# Patient Record
(demographics unavailable — no encounter records)

---

## 2024-10-09 NOTE — PLAN
[FreeTextEntry1] : Immunosuppression - Thymo induction; Cont Envarsus 4mg daily, Prednisone 5mg daily, Cellcept 1gm bid Will check tacrolimus level and adjust dose for goal ~8 Prophylaxis with nystatin, valcyte and bactrim Recent urosepsis episode- treated with IV ABx and stent was removed. He has had problems with ureteral calculi in the past and had a UTI 6 months prior to transplant. CT showed significant stone burden at the left UVJ in the native horseshoe kidney and is likely contributing to the of UTIs and plan is to schedule a nephrectomy (ideally after 6 months post txp).   Patient to follow-up with nephrology next week

## 2024-10-09 NOTE — HISTORY OF PRESENT ILLNESS
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 worsened over last year requiring dialysis started 2/2024 via permcath and now is s/p LDRT from [facility] donor on 9/9/24. Readmitted with sepsis from transplant pyelonephritis, treated with zosyn and improved. Transplant ureteral stent was removed in hospital.  He has had problems with ureteral calculi in the past and had a UTI 6 months prior to transplant and is likely the source of his current UTI. CT showed significant stone burden at the left UVJ in the native horseshoe kidney and is likely source of UTI and plan is to schedule a nephrectomy (ideally after 6 months post txp).  Currently Denies any fevers, abdominal pain, nausea, diarrhea, hematuria or dysuria.   Last Cr 1.56, Currently taking Envarsus 4mg daily (was supposed to be 5mg but he never increased it)

## 2024-10-10 NOTE — HISTORY OF PRESENT ILLNESS
[FreeTextEntry1] : now s/p renal transplant two recent hospitalizations for ? UTI, stones seen in distal left ureter all urine cultures have been negative since at least march 2024  reviewed all available CT images and compared to intraoperative fluoroscopy images from ureteroscopy before transplant. No ureteral stones seen at that time intraoperatively  review of most recent images do not show any stranding surrounding the supposed ureteral segment with stones

## 2024-10-10 NOTE — ASSESSMENT
[FreeTextEntry1] : Will have patient undergo ureteroscopy to reassess. Suspect that there might be duplication of ureter LEFT ureteroscopy laser lithotripsy stent insertion. Risks, benefits and alternatives discussed. Risk of infection, multiple procedures, ureteral injury, ureteral stricture, incomplete stones clearance, sepsis, bleeding, retention, all discussed. Also discussed the possible need for a temporary ureteral stent. The possible side effects of a temporary ureteral stent, including flank pain, hematuria, and bladder spasms, bladder pressure, small volume voids, dysuria. The patient understands that a stent is a temporary implant that must be removed in the future.

## 2024-10-17 NOTE — ASSESSMENT
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  1. s/p LUDT 9/9/24- Allograft function with good UOP and Cr trending down.  2. IS meds- Thymo induction. Envarsus with goal 8-10, MMF 1 gm po bid and prednisone 5 mg po daily.  ppx with bactrim, valcyte and nystatin.  3. Recent urosepsis episode- treated with IV Abx and stent was removed.  He has had problems with ureteral calculi in the past and had a UTI 6 months prior to transplant. CT showed significant stone burden at the left UVJ in the native horseshoe kidney and is likely contributing to the of UTIs and plan is to schedule a nephrectomy (ideally after 6 months post txp) .

## 2024-10-17 NOTE — HISTORY OF PRESENT ILLNESS
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 worsened over last year requiring dialysis started 2/2024  via permacath and now is s/p LDRT on 9/9/24.  He was recently discharged after being admitted for sepsis due to transplant pyelonephritis was treated with IV zosyn.- Transplant ureteral stent was removed on 9/29  He has had problems with ureteral calculi in the past and had a UTI 6 months prior to transplant and is likely the source of his current UTI. CT showed significant stone burden at the left UVJ in the native horseshoe kidney and is likely source of UTI and plan is to schedule a nephrectomy (ideally after 6 months post txp)   He feels well. Denies any fever, chills, dysuria, LE edema, cough, sob, chest pain, nausea, vomiting, diarrhea, constipation or any other active complaints.

## 2024-10-31 NOTE — ASSESSMENT
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  1. s/p LUDT 9/9/24- Allograft function is good, most recent Cr was 1.16 2. IS meds- Thymo induction. Envarsus with goal 8-10, MMF 1 gm po bid and prednisone 5 mg po daily.  ppx with bactrim, valcyte and nystatin.  3. h/o urosepsis episode- treated with IV Abx and stent was removed.  He has had problems with ureteral calculi in the past and had a UTI 6 months prior to transplant. CT showed significant stone burden at the left UVJ in the native horseshoe kidney and is likely contributing to the of UTIs and plan is to schedule a nephrectomy (ideally after 6 months post txp) .

## 2024-11-13 NOTE — REVIEW OF SYSTEMS
Isotretinoin Counseling: Patient should get monthly blood tests, not donate blood, not drive at night if vision affected, not share medication, and not undergo elective surgery for 6 months after tx completed. Side effects reviewed, pt to contact office should one occur. Benzoyl Peroxide Pregnancy And Lactation Text: This medication is Pregnancy Category C. It is unknown if benzoyl peroxide is excreted in breast milk. Topical Retinoid Pregnancy And Lactation Text: This medication is Pregnancy Category C. It is unknown if this medication is excreted in breast milk. Erythromycin Pregnancy And Lactation Text: This medication is Pregnancy Category B and is considered safe during pregnancy. It is also excreted in breast milk. Use Enhanced Medication Counseling?: No Bactrim Pregnancy And Lactation Text: This medication is Pregnancy Category D and is known to cause fetal risk.  It is also excreted in breast milk. Bactrim Counseling:  I discussed with the patient the risks of sulfa antibiotics including but not limited to GI upset, allergic reaction, drug rash, diarrhea, dizziness, photosensitivity, and yeast infections.  Rarely, more serious reactions can occur including but not limited to aplastic anemia, agranulocytosis, methemoglobinemia, blood dyscrasias, liver or kidney failure, lung infiltrates or desquamative/blistering drug rashes. Detail Level: Zone High Dose Vitamin A Pregnancy And Lactation Text: High dose vitamin A therapy is contraindicated during pregnancy and breast feeding. Tazorac Pregnancy And Lactation Text: This medication is not safe during pregnancy. It is unknown if this medication is excreted in breast milk. Isotretinoin Pregnancy And Lactation Text: This medication is Pregnancy Category X and is considered extremely dangerous during pregnancy. It is unknown if it is excreted in breast milk. Dapsone Pregnancy And Lactation Text: This medication is Pregnancy Category C and is not considered safe during pregnancy or breast feeding. Tazorac Counseling:  Patient advised that medication is irritating and drying.  Patient may need to apply sparingly and wash off after an hour before eventually leaving it on overnight.  The patient verbalized understanding of the proper use and possible adverse effects of tazorac.  All of the patient's questions and concerns were addressed. Birth Control Pills Pregnancy And Lactation Text: This medication should be avoided if pregnant and for the first 30 days post-partum. Benzoyl Peroxide Counseling: Patient counseled that medicine may cause skin irritation and bleach clothing.  In the event of skin irritation, the patient was advised to reduce the amount of the drug applied or use it less frequently.   The patient verbalized understanding of the proper use and possible adverse effects of benzoyl peroxide.  All of the patient's questions and concerns were addressed. Minocycline Pregnancy And Lactation Text: This medication is Pregnancy Category D and not consider safe during pregnancy. It is also excreted in breast milk. Detail Level: Detailed Birth Control Pills Counseling: Birth Control Pill Counseling: I discussed with the patient the potential side effects of OCPs including but not limited to increased risk of stroke, heart attack, thrombophlebitis, deep venous thrombosis, hepatic adenomas, breast changes, GI upset, headaches, and depression.  The patient verbalized understanding of the proper use and possible adverse effects of OCPs. All of the patient's questions and concerns were addressed. Spironolactone Counseling: Patient advised regarding risks of diarrhea, abdominal pain, hyperkalemia, birth defects (for female patients), liver toxicity and renal toxicity. The patient may need blood work to monitor liver and kidney function and potassium levels while on therapy. The patient verbalized understanding of the proper use and possible adverse effects of spironolactone.  All of the patient's questions and concerns were addressed. Topical Sulfur Applications Counseling: Topical Sulfur Counseling: Patient counseled that this medication may cause skin irritation or allergic reactions.  In the event of skin irritation, the patient was advised to reduce the amount of the drug applied or use it less frequently.   The patient verbalized understanding of the proper use and possible adverse effects of topical sulfur application.  All of the patient's questions and concerns were addressed. Tetracycline Counseling: Patient counseled regarding possible photosensitivity and increased risk for sunburn.  Patient instructed to avoid sunlight, if possible.  When exposed to sunlight, patients should wear protective clothing, sunglasses, and sunscreen.  The patient was instructed to call the office immediately if the following severe adverse effects occur:  hearing changes, easy bruising/bleeding, severe headache, or vision changes.  The patient verbalized understanding of the proper use and possible adverse effects of tetracycline.  All of the patient's questions and concerns were addressed. Patient understands to avoid pregnancy while on therapy due to potential birth defects. Dapsone Counseling: I discussed with the patient the risks of dapsone including but not limited to hemolytic anemia, agranulocytosis, rashes, methemoglobinemia, kidney failure, peripheral neuropathy, headaches, GI upset, and liver toxicity.  Patients who start dapsone require monitoring including baseline LFTs and weekly CBCs for the first month, then every month thereafter.  The patient verbalized understanding of the proper use and possible adverse effects of dapsone.  All of the patient's questions and concerns were addressed. [Negative] : Heme/Lymph Minocycline Counseling: Patient advised regarding possible photosensitivity and discoloration of the teeth, skin, lips, tongue and gums.  Patient instructed to avoid sunlight, if possible.  When exposed to sunlight, patients should wear protective clothing, sunglasses, and sunscreen.  The patient was instructed to call the office immediately if the following severe adverse effects occur:  hearing changes, easy bruising/bleeding, severe headache, or vision changes.  The patient verbalized understanding of the proper use and possible adverse effects of minocycline.  All of the patient's questions and concerns were addressed. Spironolactone Pregnancy And Lactation Text: This medication can cause feminization of the male fetus and should be avoided during pregnancy. The active metabolite is also found in breast milk. Topical Clindamycin Pregnancy And Lactation Text: This medication is Pregnancy Category B and is considered safe during pregnancy. It is unknown if it is excreted in breast milk. High Dose Vitamin A Counseling: Side effects reviewed, pt to contact office should one occur. Doxycycline Pregnancy And Lactation Text: This medication is Pregnancy Category D and not consider safe during pregnancy. It is also excreted in breast milk but is considered safe for shorter treatment courses. Azithromycin Pregnancy And Lactation Text: This medication is considered safe during pregnancy and is also secreted in breast milk. Topical Retinoid counseling:  Patient advised to apply a pea-sized amount only at bedtime and wait 30 minutes after washing their face before applying.  If too drying, patient may add a non-comedogenic moisturizer. The patient verbalized understanding of the proper use and possible adverse effects of retinoids.  All of the patient's questions and concerns were addressed. Doxycycline Counseling:  Patient counseled regarding possible photosensitivity and increased risk for sunburn.  Patient instructed to avoid sunlight, if possible.  When exposed to sunlight, patients should wear protective clothing, sunglasses, and sunscreen.  The patient was instructed to call the office immediately if the following severe adverse effects occur:  hearing changes, easy bruising/bleeding, severe headache, or vision changes.  The patient verbalized understanding of the proper use and possible adverse effects of doxycycline.  All of the patient's questions and concerns were addressed. Topical Clindamycin Counseling: Patient counseled that this medication may cause skin irritation or allergic reactions.  In the event of skin irritation, the patient was advised to reduce the amount of the drug applied or use it less frequently.   The patient verbalized understanding of the proper use and possible adverse effects of clindamycin.  All of the patient's questions and concerns were addressed. Azithromycin Counseling:  I discussed with the patient the risks of azithromycin including but not limited to GI upset, allergic reaction, drug rash, diarrhea, and yeast infections. Topical Sulfur Applications Pregnancy And Lactation Text: This medication is Pregnancy Category C and has an unknown safety profile during pregnancy. It is unknown if this topical medication is excreted in breast milk. Erythromycin Counseling:  I discussed with the patient the risks of erythromycin including but not limited to GI upset, allergic reaction, drug rash, diarrhea, increase in liver enzymes, and yeast infections.

## 2024-11-13 NOTE — ASSESSMENT
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  1. s/p LUDT 9/9/24- Allograft function is good, most recent Cr was 1.16 2. IS meds- Thymo induction. Envarsus 8 mg po bid with goal 8-10,  mg po bid and prednisone 5 mg po daily. ppx with bactrim, valcyte and nystatin.  3. h/o urosepsis episode- treated with IV Abx and stent was removed.  He has had problems with ureteral calculi in the past and had a UTI 6 months prior to transplant. CT showed significant stone burden at the left UVJ in the native horseshoe kidney and is likely contributing to the of UTIs and plan is to schedule a nephrectomy (ideally after 6 months post txp) .

## 2024-11-27 NOTE — ASSESSMENT
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  1. s/p LUDT 9/9/24- Allograft function is good, most recent Cr was 1.3 2. IS meds- Thymo induction. Envarsus 9 mg po bid with goal 8-10,  mg po bid and prednisone 5 mg po daily. ppx with bactrim, valcyte and nystatin.  3. h/o urosepsis episode- treated with IV Abx and stent was removed.  He has had problems with ureteral calculi in the past and had a UTI 6 months prior to transplant. CT showed significant stone burden at the left UVJ in the native horseshoe kidney and is likely contributing to the of UTIs and plan is to schedule a nephrectomy (ideally after 6 months post txp) .

## 2024-12-11 NOTE — PHYSICAL EXAM
[General Appearance - Alert] : alert [General Appearance - In No Acute Distress] : in no acute distress [Sclera] : the sclera and conjunctiva were normal [PERRL With Normal Accommodation] : pupils were equal in size, round, and reactive to light [Extraocular Movements] : extraocular movements were intact [Outer Ear] : the ears and nose were normal in appearance [Oropharynx] : the oropharynx was normal [Neck Appearance] : the appearance of the neck was normal [Neck Cervical Mass (___cm)] : no neck mass was observed [Jugular Venous Distention Increased] : there was no jugular-venous distention [Thyroid Diffuse Enlargement] : the thyroid was not enlarged [Thyroid Nodule] : there were no palpable thyroid nodules [Auscultation Breath Sounds / Voice Sounds] : lungs were clear to auscultation bilaterally [Heart Rate And Rhythm] : heart rate was normal and rhythm regular [Heart Sounds] : normal S1 and S2 [Heart Sounds Gallop] : no gallops [Murmurs] : no murmurs [Heart Sounds Pericardial Friction Rub] : no pericardial rub [Full Pulse] : the pedal pulses are present [Edema] : there was no peripheral edema [Bowel Sounds] : normal bowel sounds [Abdomen Soft] : soft [Abdomen Tenderness] : non-tender [Abdomen Mass (___ Cm)] : no abdominal mass palpated [Abnormal Walk] : normal gait [Musculoskeletal - Swelling] : no joint swelling seen [Nail Clubbing] : no clubbing  or cyanosis of the fingernails [Motor Tone] : muscle strength and tone were normal [Skin Color & Pigmentation] : normal skin color and pigmentation [Skin Turgor] : normal skin turgor [Normal] : normal [] : no rash [Deep Tendon Reflexes (DTR)] : deep tendon reflexes were 2+ and symmetric [Sensation] : the sensory exam was normal to light touch and pinprick [No Focal Deficits] : no focal deficits

## 2024-12-11 NOTE — ASSESSMENT
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  1. s/p LUDT 9/9/24- Allograft function is good, most recent Cr was 1.3 2. IS meds- Thymo induction. Envarsus 10 mg po bid with goal 8-10,  mg po bid and prednisone 5 mg po daily. ppx with bactrim, valcyte and nystatin.  3. h/o urosepsis episode- treated with IV Abx and stent was removed.  He has had problems with ureteral calculi in the past and had a UTI 6 months prior to transplant. CT showed significant stone burden at the left UVJ in the native horseshoe kidney and is likely contributing to the of UTIs and plan is to schedule a nephrectomy (ideally after 6 months post txp) .

## 2025-01-08 NOTE — ASSESSMENT
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  1. s/p LUDT 9/9/24- Allograft function is good, most recent Cr was 1.3 2. IS meds- Thymo induction. Envarsus 10 mg po bid with goal 8-10,  mg po bid and prednisone 5 mg po daily. ppx with bactrim and valcyte  3. h/o urosepsis episode- treated with IV Abx and stent was removed.  He has had problems with ureteral calculi in the past and had a UTI 6 months prior to transplant. CT showed significant stone burden at the left UVJ in the native horseshoe kidney and is likely contributing to the of UTIs and plan is to schedule a nephrectomy (ideally after 6 months post txp) .

## 2025-01-08 NOTE — PHYSICAL EXAM
[General Appearance - Alert] : alert [General Appearance - In No Acute Distress] : in no acute distress [Sclera] : the sclera and conjunctiva were normal [PERRL With Normal Accommodation] : pupils were equal in size, round, and reactive to light [Extraocular Movements] : extraocular movements were intact [Outer Ear] : the ears and nose were normal in appearance [Oropharynx] : the oropharynx was normal [Neck Cervical Mass (___cm)] : no neck mass was observed [Neck Appearance] : the appearance of the neck was normal [Jugular Venous Distention Increased] : there was no jugular-venous distention [Thyroid Diffuse Enlargement] : the thyroid was not enlarged [Thyroid Nodule] : there were no palpable thyroid nodules [Auscultation Breath Sounds / Voice Sounds] : lungs were clear to auscultation bilaterally [Heart Rate And Rhythm] : heart rate was normal and rhythm regular [Heart Sounds] : normal S1 and S2 [Heart Sounds Gallop] : no gallops [Murmurs] : no murmurs [Heart Sounds Pericardial Friction Rub] : no pericardial rub [Full Pulse] : the pedal pulses are present [Edema] : there was no peripheral edema [Bowel Sounds] : normal bowel sounds [Abdomen Soft] : soft [Abdomen Tenderness] : non-tender [Abdomen Mass (___ Cm)] : no abdominal mass palpated [Abnormal Walk] : normal gait [Nail Clubbing] : no clubbing  or cyanosis of the fingernails [Musculoskeletal - Swelling] : no joint swelling seen [Skin Color & Pigmentation] : normal skin color and pigmentation [Motor Tone] : muscle strength and tone were normal [Skin Turgor] : normal skin turgor [] : no rash [Normal] : normal [Deep Tendon Reflexes (DTR)] : deep tendon reflexes were 2+ and symmetric [Sensation] : the sensory exam was normal to light touch and pinprick [No Focal Deficits] : no focal deficits

## 2025-02-12 NOTE — ASSESSMENT
[FreeTextEntry1] : Will have patient undergo ureteroscopy to reassess. Suspect that there might be duplication of ureter or calcifications are extra-ureteral LEFT ureteroscopy laser lithotripsy stent insertion. Risks, benefits and alternatives discussed. Risk of infection, multiple procedures, ureteral injury, ureteral stricture, incomplete stones clearance, sepsis, bleeding, retention, all discussed. Also discussed the possible need for a temporary ureteral stent. The possible side effects of a temporary ureteral stent, including flank pain, hematuria, and bladder spasms, bladder pressure, small volume voids, dysuria. The patient understands that a stent is a temporary implant that must be removed in the future.  CT stone hunt before surgery Pt's mother present by phone for entire encounter today

## 2025-02-12 NOTE — HISTORY OF PRESENT ILLNESS
[FreeTextEntry1] : 22 yr old man kidney stones  Hx- horseshoe kidney, s/p partial nephrectomy 2013, developed HTN, Gout, hyperlipidemia and CKD. s/p renal transplant 9/9/24.  two recent hospitalizations for ? UTI, stones seen in distal left ureter all urine cultures have been negative since at least march 2024  reviewed all available CT images and compared to intraoperative fluoroscopy images from ureteroscopy before transplant. 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

## 2025-03-17 NOTE — HISTORY OF PRESENT ILLNESS
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 worsened over last year requiring dialysis started 2/2024  via permacath and now is s/p LDRT on 9/9/24. Post txp he was admitted for sepsis due to transplant pyelonephritis and was treated with IV zosyn. Transplant ureteral stent was removed on 9/29/25  He has had problems with ureteral calculi in the past and had a UTI 6 months prior to transplant . CT showed significant stone burden at the left UVJ in the native horseshoe kidney and is likely source of UTI .Recently seen by urology Dr Riddle.  He feels well. Denies any fever, chills, dysuria, LE edema, cough, sob, chest pain, nausea, vomiting, diarrhea, constipation or any other active complaints.

## 2025-03-17 NOTE — ASSESSMENT
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  1. s/p LUDT 9/9/24- Allograft function is good, most recent Cr was 1.3 2. IS meds- Thymo induction. Envarsus 10 mg po bid with goal 8-10,  mg po bid and prednisone 5 mg po daily. ppx with bactrim and valcyte  3. h/o urosepsis episode- treated with IV Abx and stent was removed.  4. h/o ureteral calculi in the past and had a UTI 6 months prior to transplant. CT showed significant stone burden at the left UVJ in the native horseshoe kidney. Following for Dr Riddle ( urology)

## 2025-03-17 NOTE — HISTORY OF PRESENT ILLNESS
[FreeTextEntry1] : 22 yr old man kidney stones  s/p left URS with Dr. Riddle on 3/11/25 pathology- pending  Hx- horseshoe kidney, s/p partial nephrectomy 2013, developed HTN, Gout, hyperlipidemia and CKD. s/p renal transplant 9/9/24.

## 2025-04-10 NOTE — ASSESSMENT
[FreeTextEntry1] : No stones seen during left ureteroscopy with Dr. Riddle  Pt states that he has had kidney stones in the past which required surgery   Increase fluid intake to 3 L per day   plan 24 hr x 2 urine before next visit  Renal sono at or before next visit  Follow up in 3 months

## 2025-04-10 NOTE — HISTORY OF PRESENT ILLNESS
[FreeTextEntry1] : 22 yr old male presents to follow up with kidney stones.  Hx- horseshoe kidney, s/p partial nephrectomy 2013, developed HTN, Gout, hyperlipidemia and CKD. s/p renal transplant 9/9/24.  s/p cystoscopy, left retrograde pyelogram of native kidney and left ureteroscopy of native kidney on 3/11/25 - no stones seen   [Dysuria] : no dysuria [Hematuria - Gross] : no gross hematuria [None] : None

## 2025-05-14 NOTE — HISTORY OF PRESENT ILLNESS
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 worsened over last year requiring dialysis started 2/2024  via permacath and now is s/p LDRT on 9/9/24. Post txp he was admitted for sepsis due to transplant pyelonephritis and was treated with IV zosyn. Transplant ureteral stent was removed on 9/29/25  He has had problems with ureteral calculi in the past and had a UTI 6 months prior to transplant . CT showed significant stone burden at the left UVJ in the native horseshoe kidney and is likely source of UTI . seen by urology Dr Riddle .s/p cystoscopy, left retrograde pyelogram of native kidney and left ureteroscopy of native kidney on 3/11/25- no stones seen  He feels well. Denies any fever, chills, dysuria, LE edema, cough, sob, chest pain, nausea, vomiting, diarrhea, constipation or any other active complaints.

## 2025-05-14 NOTE — ASSESSMENT
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  1. s/p LUDT 9/9/24- Allograft function is good, most recent Cr was 1.5 2. IS meds- Thymo induction. Envarsus 10 mg po bid with goal 8-10,  mg po bid and prednisone 5 mg po daily. ppx with bactrim and valcyte  3. h/o urosepsis episode- treated with IV Abx and stent was removed.  4. h/o ureteral calculi in the past and had a UTI 6 months prior to transplant. CT showed significant stone burden at the left UVJ in the native horseshoe kidney. Following for Dr Riddle ( urology)  .s/p cystoscopy, left retrograde pyelogram of native kidney and left ureteroscopy of native kidney on 3/11/25- no stones seen

## 2025-06-12 NOTE — ASSESSMENT
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  1. s/p LUDT 9/9/24- Allograft function is good, most recent Cr was 1.5 2. IS meds- Thymo induction. on Envarsus with goal 6-8 ,  mg po bid and prednisone 5 mg po daily.  3. h/o urosepsis episode post txp- treated with IV Abx and stent was removed.  4. h/o ureteral calculi in the past and had a UTI 6 months prior to transplant. CT showed significant stone burden at the left UVJ in the native horseshoe kidney. Following for Dr Riddle ( urology)  .s/p cystoscopy, left retrograde pyelogram of native kidney and left ureteroscopy of native kidney on 3/11/25- no stones seen. has f/u appt in 3 months

## 2025-06-12 NOTE — HISTORY OF PRESENT ILLNESS
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 worsened over last year requiring dialysis started 2/2024  via permacath and now is s/p LDRT on 9/9/24. Post txp he was admitted for sepsis due to transplant pyelonephritis and was treated with IV zosyn. Transplant ureteral stent was removed on 9/29/25  He has had problems with ureteral calculi in the past and had a UTI 6 months prior to transplant . CT showed significant stone burden at the left UVJ in the native horseshoe kidney and is likely source of UTI . seen by urology Dr Riddle .s/p cystoscopy, left retrograde pyelogram of native kidney and left ureteroscopy of native kidney on 3/11/25- no stones seen.   He feels well. Denies any fever, chills, dysuria, LE edema, cough, sob, chest pain, nausea, vomiting, diarrhea, constipation or any other active complaints.

## 2025-06-12 NOTE — PHYSICAL EXAM
[General Appearance - Alert] : alert [Sclera] : the sclera and conjunctiva were normal [General Appearance - In No Acute Distress] : in no acute distress [PERRL With Normal Accommodation] : pupils were equal in size, round, and reactive to light [Extraocular Movements] : extraocular movements were intact [Oropharynx] : the oropharynx was normal [Outer Ear] : the ears and nose were normal in appearance [Neck Appearance] : the appearance of the neck was normal [Neck Cervical Mass (___cm)] : no neck mass was observed [Thyroid Diffuse Enlargement] : the thyroid was not enlarged [Jugular Venous Distention Increased] : there was no jugular-venous distention [Thyroid Nodule] : there were no palpable thyroid nodules [Auscultation Breath Sounds / Voice Sounds] : lungs were clear to auscultation bilaterally [Heart Rate And Rhythm] : heart rate was normal and rhythm regular [Heart Sounds] : normal S1 and S2 [Heart Sounds Gallop] : no gallops [Murmurs] : no murmurs [Heart Sounds Pericardial Friction Rub] : no pericardial rub [Full Pulse] : the pedal pulses are present [Edema] : there was no peripheral edema [Bowel Sounds] : normal bowel sounds [Abdomen Soft] : soft [Abdomen Tenderness] : non-tender [Abdomen Mass (___ Cm)] : no abdominal mass palpated [Nail Clubbing] : no clubbing  or cyanosis of the fingernails [Abnormal Walk] : normal gait [Musculoskeletal - Swelling] : no joint swelling seen [Motor Tone] : muscle strength and tone were normal [Skin Color & Pigmentation] : normal skin color and pigmentation [Skin Turgor] : normal skin turgor [] : no rash [Normal] : normal [Sensation] : the sensory exam was normal to light touch and pinprick [Deep Tendon Reflexes (DTR)] : deep tendon reflexes were 2+ and symmetric [No Focal Deficits] : no focal deficits

## 2025-07-02 NOTE — HISTORY OF PRESENT ILLNESS
[FreeTextEntry1] : 21y/o man s/p renal transplant 9/9/24 now s/p two ureteroscopies of native ureter--no stones present pelvic calcifications are not within the collecting system intraoperative fluoroscopic images and CT scan and intraoperative findings discussed with patient  Renal ultrasound today: native kidneys not well visualized; transplant kidney unremarkable and without hydronephrosis

## 2025-07-02 NOTE — ASSESSMENT
[FreeTextEntry1] : Diet modification reviewed at length- increasing fluids (primarily water), citrus is good, and decreasing/moderating salt, animal flesh protein, oxalate containing foods, and moderation of calcium intake (1000 mg/day is USRDA).  I reviewed with the patient the risks of metabolic stone disease given their underlying risk parameters (all of which include large stones, multiple stones, bilateral stones, family history, and young age), and the indications for 24 hour urine metabolic assessment.  We also discussed benefits of regular exercise and weight loss as independent risk reducers for stones.  Follow up prn